# Patient Record
Sex: FEMALE | Employment: FULL TIME | URBAN - METROPOLITAN AREA
[De-identification: names, ages, dates, MRNs, and addresses within clinical notes are randomized per-mention and may not be internally consistent; named-entity substitution may affect disease eponyms.]

---

## 2017-05-08 ENCOUNTER — ALLSCRIPTS OFFICE VISIT (OUTPATIENT)
Dept: OTHER | Facility: OTHER | Age: 57
End: 2017-05-08

## 2017-05-08 DIAGNOSIS — E78.5 HYPERLIPIDEMIA: ICD-10-CM

## 2017-05-08 DIAGNOSIS — M25.50 PAIN IN JOINT: ICD-10-CM

## 2017-05-22 ENCOUNTER — APPOINTMENT (OUTPATIENT)
Dept: LAB | Facility: CLINIC | Age: 57
End: 2017-05-22
Payer: COMMERCIAL

## 2017-05-22 DIAGNOSIS — E78.5 HYPERLIPIDEMIA: ICD-10-CM

## 2017-05-22 DIAGNOSIS — M25.50 PAIN IN JOINT: ICD-10-CM

## 2017-05-22 LAB
25(OH)D3 SERPL-MCNC: 37.5 NG/ML (ref 30–100)
CHOLEST SERPL-MCNC: 223 MG/DL (ref 50–200)
HDLC SERPL-MCNC: 57 MG/DL (ref 40–60)
LDLC SERPL CALC-MCNC: 149 MG/DL (ref 0–100)
T4 FREE SERPL-MCNC: 0.94 NG/DL (ref 0.76–1.46)
TRIGL SERPL-MCNC: 83 MG/DL
TSH SERPL DL<=0.05 MIU/L-ACNC: 1.19 UIU/ML (ref 0.36–3.74)

## 2017-05-22 PROCEDURE — 86430 RHEUMATOID FACTOR TEST QUAL: CPT

## 2017-05-22 PROCEDURE — 82306 VITAMIN D 25 HYDROXY: CPT

## 2017-05-22 PROCEDURE — 36415 COLL VENOUS BLD VENIPUNCTURE: CPT

## 2017-05-22 PROCEDURE — 84443 ASSAY THYROID STIM HORMONE: CPT

## 2017-05-22 PROCEDURE — 84439 ASSAY OF FREE THYROXINE: CPT

## 2017-05-22 PROCEDURE — 80061 LIPID PANEL: CPT

## 2017-05-23 LAB — RHEUMATOID FACT SER QL LA: NEGATIVE

## 2017-05-29 ENCOUNTER — GENERIC CONVERSION - ENCOUNTER (OUTPATIENT)
Dept: OTHER | Facility: OTHER | Age: 57
End: 2017-05-29

## 2018-01-13 NOTE — RESULT NOTES
Discussion/Summary   Cholesterol is elevated at 223, which should ideally be less than 200  Bad cholesterol is elevated at 149, which should be less than 100  In addition to changes to diet and increasing exercise, I would recommend starting a low dose statin  Pravastatin has been prescribed  All other lab results are normal   Rheumatoid factor is negative  Verified Results  (1) RHEUMATOID FACTOR SCREEN [id number] 09:59AM Viridiana Lau Order Number: [id number]     Test Name Result Flag Reference   RHEUMATOID FACTOR Negative  Negative     (1) LIPID PANEL FASTING W DIRECT LDL REFLEX [id number] 09:59AM Gee DOMINGO Order Number: [id number]     Test Name Result Flag Reference   CHOLESTEROL 223 mg/dL H    LDL CHOLESTEROL CALCULATED 149 mg/dL H 0-100   Triglyceride:         Normal              <150 mg/dl       Borderline High    150-199 mg/dl       High               200-499 mg/dl       Very High          >499 mg/dl  Cholesterol:         Desirable        <200 mg/dl      Borderline High  200-239 mg/dl      High             >239 mg/dl  HDL Cholesterol:        High    >59 mg/dL      Low     <41 mg/dL  LDL Cholesterol:        Optimal          <100 mg/dl        Near Optimal     100-129 mg/dl        Above Optimal          Borderline High   130-159 mg/dl          High              160-189 mg/dl          Very High        >189 mg/dl  LDL CALCULATED:    This screening LDL is a calculated result  It does not have the accuracy of the Direct Measured LDL in the monitoring of patients with hyperlipidemia and/or statin therapy  Direct Measure LDL (YVW843) must be ordered separately in these patients  TRIGLYCERIDES 83 mg/dL  <=150   Specimen collection should occur prior to N-Acetylcysteine or Metamizole administration due to the potential for falsely depressed results     HDL,DIRECT 57 mg/dL  40-60   Specimen collection should occur prior to Metamizole administration due to the potential for falsely depressed results  (1) T4, FREE [id number] 09:59AM Oneta Boas Order Number: [id number]     Test Name Result Flag Reference   T4,FREE 0 94 ng/dL  0 76-1 46     (1) TSH [id number] 09:59AM [facility] Order Number: [id number]     Test Name Result Flag Reference   TSH 1 185 uIU/mL  0 358-3 740   Patients undergoing fluorescein dye angiography may retain small amounts of fluorescein in the body for 48-72 hours post procedure  Samples containing fluorescein can produce falsely depressed TSH values  If the patient had this procedure,a specimen should be resubmitted post fluorescein clearance  The recommended reference ranges for TSH during pregnancy are as follows:  First trimester 0 1 to 2 5 uIU/mL  Second trimester  0 2 to 3 0 uIU/mL  Third trimester 0 3 to 3 0 uIU/m     (1) VITAMIN D 25-HYDROXY [id number] 09:59AM [facility] Order Number: [id number]     Test Name Result Flag Reference   VIT D 25-HYDROX 37 5 ng/mL  30 0-100 0   This assay is a certified procedure of the CDC Vitamin D Standardization Certification Program (VDSCP)     Deficiency <20ng/ml   Insufficiency 20-30ng/ml   Sufficient  ng/ml     *Patients undergoing fluorescein dye angiography may retain small amounts of fluorescein in the body for 48-72 hours post procedure  Samples containing fluorescein can produce falsely elevated Vitamin D values  If the patient had this procedure, a specimen should be resubmitted post fluorescein clearance         Plan  Hyperlipidemia    · Pravastatin Sodium 20 MG Oral Tablet; TAKE 1 TABLET AT BEDTIME    Signatures   Electronically signed by : KURTIS Monroe; May 29 2017  5:48AM EST                       (Author)

## 2018-01-15 VITALS
SYSTOLIC BLOOD PRESSURE: 110 MMHG | HEART RATE: 80 BPM | TEMPERATURE: 96.3 F | RESPIRATION RATE: 16 BRPM | DIASTOLIC BLOOD PRESSURE: 60 MMHG | OXYGEN SATURATION: 98 % | HEIGHT: 61 IN | WEIGHT: 142.13 LBS | BODY MASS INDEX: 26.83 KG/M2

## 2020-06-02 ENCOUNTER — TELEMEDICINE (OUTPATIENT)
Dept: DERMATOLOGY | Facility: CLINIC | Age: 60
End: 2020-06-02
Payer: COMMERCIAL

## 2020-06-02 DIAGNOSIS — R21 RASH: ICD-10-CM

## 2020-06-02 DIAGNOSIS — L21.9 SEBORRHEIC DERMATITIS: Primary | ICD-10-CM

## 2020-06-02 PROCEDURE — 99204 OFFICE O/P NEW MOD 45 MIN: CPT | Performed by: STUDENT IN AN ORGANIZED HEALTH CARE EDUCATION/TRAINING PROGRAM

## 2020-06-02 RX ORDER — FLUOCINONIDE TOPICAL SOLUTION USP, 0.05% 0.5 MG/ML
SOLUTION TOPICAL
Qty: 60 ML | Refills: 3 | Status: SHIPPED | OUTPATIENT
Start: 2020-06-02 | End: 2020-07-28 | Stop reason: SDUPTHER

## 2020-06-02 RX ORDER — KETOCONAZOLE 20 MG/G
CREAM TOPICAL
Qty: 60 G | Refills: 3 | Status: SHIPPED | OUTPATIENT
Start: 2020-06-02 | End: 2020-07-28 | Stop reason: SDUPTHER

## 2020-06-02 RX ORDER — KETOCONAZOLE 20 MG/ML
SHAMPOO TOPICAL
Qty: 120 ML | Refills: 3 | Status: SHIPPED | OUTPATIENT
Start: 2020-06-02 | End: 2020-07-28 | Stop reason: SDUPTHER

## 2020-06-02 RX ORDER — KETOTIFEN FUMARATE 0.35 MG/ML
1 SOLUTION/ DROPS OPHTHALMIC EVERY 12 HOURS PRN
COMMUNITY
Start: 2015-05-22

## 2020-06-25 ENCOUNTER — TELEPHONE (OUTPATIENT)
Dept: DERMATOLOGY | Facility: CLINIC | Age: 60
End: 2020-06-25

## 2020-07-28 ENCOUNTER — OFFICE VISIT (OUTPATIENT)
Dept: DERMATOLOGY | Facility: CLINIC | Age: 60
End: 2020-07-28
Payer: COMMERCIAL

## 2020-07-28 VITALS — BODY MASS INDEX: 26.51 KG/M2 | WEIGHT: 140.4 LBS | HEIGHT: 61 IN | TEMPERATURE: 97.4 F

## 2020-07-28 DIAGNOSIS — L21.9 SEBORRHEIC DERMATITIS: ICD-10-CM

## 2020-07-28 PROCEDURE — 99213 OFFICE O/P EST LOW 20 MIN: CPT | Performed by: STUDENT IN AN ORGANIZED HEALTH CARE EDUCATION/TRAINING PROGRAM

## 2020-07-28 RX ORDER — KETOCONAZOLE 20 MG/G
CREAM TOPICAL
Qty: 60 G | Refills: 3 | Status: SHIPPED | OUTPATIENT
Start: 2020-07-28 | End: 2022-04-18

## 2020-07-28 RX ORDER — FLUOCINONIDE TOPICAL SOLUTION USP, 0.05% 0.5 MG/ML
SOLUTION TOPICAL
Qty: 60 ML | Refills: 3 | Status: SHIPPED | OUTPATIENT
Start: 2020-07-28 | End: 2022-04-18

## 2020-07-28 RX ORDER — KETOCONAZOLE 20 MG/ML
SHAMPOO TOPICAL
Qty: 120 ML | Refills: 3 | Status: SHIPPED | OUTPATIENT
Start: 2020-07-28 | End: 2022-04-18

## 2020-07-28 NOTE — PROGRESS NOTES
[facility] Follow Up Note    Patient Name: Nam Mckinley  Encounter Date: 7/28/2020    Today's Chief Concerns:   Concern #1:  Follow up for thumb   Concern #2:     Concern #3:      Current Medications:    Current Outpatient Medications:     fluocinonide (LIDEX) 0 05 % external solution, Apply topically to scalp one to two times a day when itchy , Disp: 60 mL, Rfl: 3    ketoconazole (NIZORAL) 2 % cream, Apply topically one to two times a day to affected areas of face when itchy, Disp: 60 g, Rfl: 3    ketoconazole (NIZORAL) 2 % shampoo, Apply Shampoo, lather and leave for 5 minutes then rinse  Use two to three times a week , Disp: 120 mL, Rfl: 3    ketotifen (ZADITOR) 0 025 % ophthalmic solution, Apply 1 drop to eye every 12 (twelve) hours as needed, Disp: , Rfl:     lifitegrast (Xiidra) 5 % op solution, , Disp: , Rfl:     mupirocin (BACTROBAN) 2 % ointment, Apply to left thumb daily after washing with soap and water  Apply band aid after putting ointment on , Disp: 30 g, Rfl: 3    CONSTITUTIONAL:   Vitals:    07/28/20 0803   Temp: (!) 97 4 °F (36 3 °C)   Weight: 63 7 kg (140 lb 6 4 oz)   Height: 5' 1" (1 549 m)         Specific Alerts:    Have you been seen by a [facility] Dermatologist in the last 3 years? YES    Are you pregnant or planning to become pregnant? No    Are you currently or planning to be nursing or breast feeding? No    No Known Allergies    May we call your Preferred Phone number to discuss your specific medical information? YES    May we leave a detailed message that includes your specific medical information? YES    Have you traveled outside of the [facility] in the past 3 months? No    Do you currently have a pacemaker or defibrillator? No    Do you have any artificial heart valves, joints, plates, screws, rods, stents, pins, etc? No   - If Yes, were any placed within the last 2 years? Do you require any medications prior to a surgical procedure? No   - If Yes, for which procedure? - If Yes, what medications to you require? Are you taking any medications that cause you to bleed more easily ("blood thinners") No    Have you ever experienced a rapid heartbeat with epinephrine? No    Have you ever been treated with "gold" (gold sodium thiomalate) therapy? No    [facility] can help with wrinkles, "laugh lines," facial volume loss, "double chin," "love handles," age spots, and more  Are you interested in learning today about some of the skin enhancement procedures that we offer? (If Yes, please provide more detail) No    Review of Systems:  Have you recently had or currently have any of the following? · Fever or chills: No  · Night Sweats: No  · Headaches: No  · Weight Gain: No  · Weight Loss: No  · Blurry Vision: No  · Nausea: No  · Vomiting: No  · Diarrhea: No  · Blood in Stool: No  · Abdominal Pain: No  · Itchy Skin: No  · Painful Joints: No  · Swollen Joints: No  · Muscle Pain: No  · Irregular Mole: No  · Sun Burn: No  · Dry Skin: No  · Skin Color Changes: No  · Scar or Keloid: No  · Cold Sores/Fever Blisters: No  · Bacterial Infections/MRSA: No  · Anxiety: No  · Depression: No  · Suicidal or Homicidal Thoughts: No      PSYCH: Normal mood and affect  EYES: Normal conjunctiva  ENT: Normal lips and oral mucosa  CARDIOVASCULAR: No edema  RESPIRATORY: Normal respirations  HEME/LYMPH/IMMUNO:  No regional lymphadenopathy except as noted below in ASSESSMENT AND PLAN BY DIAGNOSIS    FULL ORGAN SYSTEM SKIN EXAM (SKIN)   Hair, Scalp, Ears, Face Normal except as noted below in Assessment   Neck Normal except as noted below in Assessment       Left Arm/Hand/Fingers Normal except as noted below in Assessment       1  SEBORRHEIC DERMATITIS    Physical Exam:   Anatomic Location Affected:  Scalp, upper back   Morphological Description:  Diffuse scale throughout scalp, no rash noted currently   Very tender/itchy to touch   Pertinent Positives:   Pertinent Negatives: No lymphadenopathy    Additional History of Present Condition:  Patient states she still have severe itching on her scalp and upper back  She states the Ketoconazole shampoo and cream and Lidex solution are helping with the symptoms  She is concerned she will have to continue to use the products for the rest of her life  Assessment and Plan:  Based on a thorough discussion of this condition and the management approach to it (including a comprehensive discussion of the known risks, side effects and potential benefits of treatment), the patient (family) agrees to implement the following specific plan:   Only use Ketoconazole 2% shampoo 2-3 times a week  Lather into hair and scalp, let sit for 5 minutes and rinse   Apply Lidex 0 05% solution one to two times daily for itch on scalp   Apply Ketoconazole 2% cream one to two times a day on face or upper back for itch   Try to limit and manage stress if possible   Rinse shampoo and conditioner out of hair completely   Try removing one hair care product a week to see if it helps the condition  Seborrheic Dermatitis   Seborrheic dermatitis is a common, chronic or relapsing form of eczema/dermatitis that mainly affects the sebaceous, gland-rich regions of the scalp, face, and trunk  There are infantile and adult forms of seborrhoeic dermatitis  It is sometimes associated with psoriasis and, in that clinical scenario, may be referred to as "sebo-psoriasis "  Seborrheic dermatitis is also known as "seborrheic eczema "  Dandruff (also called "pityriasis capitis") is an uninflamed form of seborrhoeic dermatitis  Dandruff presents as bran-like scaly patches scattered within hair-bearing areas of the scalp  In an infant, this condition may be referred to as "cradle cap "  The cause of seborrheic dermatitis is not completely understood   It is associated with proliferation of various species of the skin commensal Malassezia, in its yeast (non-pathogenic) form  Its metabolites (such as the fatty acids oleic acid, malssezin, and indole-3-carbaldehyde) may cause an inflammatory reaction  Differences in skin barrier lipid content and function may account for individual presentations  Adult Seborrheic Dermatitis  Adult seborrheic dermatitis tends to begin in late adolescence; prevalence is greatest in young adults and in the elderly  It is more common in males than in females  The following factors are sometimes associated with severe adult seborrheic dermatitis:   Oily skin   Familial tendency to seborrhoeic dermatitis or a family history of psoriasis   Immunosuppression: organ transplant recipient, human immunodeficiency virus (HIV) infection and patients with lymphoma   Neurological and psychiatric diseases: Parkinson disease, tardive dyskinesia, depression, epilepsy, facial nerve palsy, spinal cord injury and congenital disorders such as Down syndrome   Treatment for psoriasis with psoralen and ultraviolet A (PUVA) therapy   Lack of sleep   Stressful events  In adults, seborrheic dermatitis may typically affect the scalp, face (creases around the nose, behind ears, within eyebrows) and upper trunk  Typical clinical features include:   Winter flares, improving in summer following sun exposure   Minimal itch most of the time   Combination oily and dry mid-facial skin   Ill-defined localized scaly patches or diffuse scale in the scalp   Blepharitis; scaly red eyelid margins   [place]-pink, thin, scaly, and ill-defined plaques in skin folds on both sides of the face   Petal or ring-shaped flaky patches on hair-line and on anterior chest   Rash in armpits, under the breasts, in the groin folds and genital creases   Superficial folliculitis (inflamed hair follicles) on cheeks and upper trunk    Seborrheic dermatitis is diagnosed by its clinical appearance and behavior   Skin biopsy may be helpful but is rarely necessary to make this diagnosis  2     RASH: healing lesion s/p cryotherapy for verruca at plastic surgery office at [facility]     Physical Exam:  · (Anatomic Location); (Size and Morphological Description); (Differential Diagnosis):  ? Left thumb--> HEAED  · Pertinent Positives:  · Pertinent Negatives:     ? Additional History of Present Condition:   Frozen twice by placetic surgery at [facility]    Was diagnosed by [facility] as a wart         Assessment and Plan:  Based on a thorough discussion of this condition and the management approach to it (including a comprehensive discussion of the known risks, side effects and potential benefits of treatment), the patient (family) agrees to implement the following specific plan:  Area now healed        Scribe Attestation    I,:   Skye Blackwood am acting as a scribe while in the presence of the attending physician :        I,:   Char Rey MD personally performed the services described in this documentation    as scribed in my presence :

## 2020-07-28 NOTE — PATIENT INSTRUCTIONS
1  SEBORRHEIC DERMATITIS    Physical Exam:   Anatomic Location Affected:  Scalp, upper back   Morphological Description:     Pertinent Positives:   Pertinent Negatives: No lymphadenopathy    Additional History of Present Condition:  Patient states she still have severe itching on her scalp and upper back  She states the Ketoconazole shampoo and cream and Lidex solution are helping with the symptoms  She is concerned she will have to continue to use the products for the rest of her life  Assessment and Plan:  Based on a thorough discussion of this condition and the management approach to it (including a comprehensive discussion of the known risks, side effects and potential benefits of treatment), the patient (family) agrees to implement the following specific plan:   Only use Ketoconazole 2% shampoo 2-3 times a week  Lather into hair and scalp, let sit for 5 minutes and rinse   Apply Lidex 0 05% solution one to two times daily for itch on scalp   Apply Ketoconazole 2% cream one to two times a day on upper back for itch   Try to limit and manage stress if possible   Rinse shampoo and conditioner out of hair completely   Try removing one hair care product a week to see if it helps the condition  Seborrheic Dermatitis   Seborrheic dermatitis is a common, chronic or relapsing form of eczema/dermatitis that mainly affects the sebaceous, gland-rich regions of the scalp, face, and trunk  There are infantile and adult forms of seborrhoeic dermatitis  It is sometimes associated with psoriasis and, in that clinical scenario, may be referred to as "sebo-psoriasis "  Seborrheic dermatitis is also known as "seborrheic eczema "  Dandruff (also called "pityriasis capitis") is an uninflamed form of seborrhoeic dermatitis  Dandruff presents as bran-like scaly patches scattered within hair-bearing areas of the scalp    In an infant, this condition may be referred to as "cradle cap "  The cause of seborrheic dermatitis is not completely understood  It is associated with proliferation of various species of the skin commensal Malassezia, in its yeast (non-pathogenic) form  Its metabolites (such as the fatty acids oleic acid, malssezin, and indole-3-carbaldehyde) may cause an inflammatory reaction  Differences in skin barrier lipid content and function may account for individual presentations  Adult Seborrheic Dermatitis  Adult seborrheic dermatitis tends to begin in late adolescence; prevalence is greatest in young adults and in the elderly  It is more common in males than in females  The following factors are sometimes associated with severe adult seborrheic dermatitis:   Oily skin   Familial tendency to seborrhoeic dermatitis or a family history of psoriasis   Immunosuppression: organ transplant recipient, human immunodeficiency virus (HIV) infection and patients with lymphoma   Neurological and psychiatric diseases: Parkinson disease, tardive dyskinesia, depression, epilepsy, facial nerve palsy, spinal cord injury and congenital disorders such as Down syndrome   Treatment for psoriasis with psoralen and ultraviolet A (PUVA) therapy   Lack of sleep   Stressful events  In adults, seborrheic dermatitis may typically affect the scalp, face (creases around the nose, behind ears, within eyebrows) and upper trunk   Typical clinical features include:   Winter flares, improving in summer following sun exposure   Minimal itch most of the time   Combination oily and dry mid-facial skin   Ill-defined localized scaly patches or diffuse scale in the scalp   Blepharitis; scaly red eyelid margins   [place]-pink, thin, scaly, and ill-defined plaques in skin folds on both sides of the face   Petal or ring-shaped flaky patches on hair-line and on anterior chest   Rash in armpits, under the breasts, in the groin folds and genital creases   Superficial folliculitis (inflamed hair follicles) on cheeks and upper trunk    Seborrheic dermatitis is diagnosed by its clinical appearance and behavior  Skin biopsy may be helpful but is rarely necessary to make this diagnosis

## 2020-09-15 ENCOUNTER — TELEPHONE (OUTPATIENT)
Dept: DERMATOLOGY | Facility: CLINIC | Age: 60
End: 2020-09-15

## 2020-09-15 NOTE — TELEPHONE ENCOUNTER
Patient called in stating that someone from the pharmacy told her that her RX's has been canceled  She is requesting a refill on her shampoo and her lotion  She asked to speak with Karen Angeles and was told she is busy at the moment but I will send her a message

## 2021-03-18 ENCOUNTER — IMMUNIZATIONS (OUTPATIENT)
Dept: FAMILY MEDICINE CLINIC | Facility: HOSPITAL | Age: 61
End: 2021-03-18

## 2021-03-18 DIAGNOSIS — Z23 ENCOUNTER FOR IMMUNIZATION: Primary | ICD-10-CM

## 2021-03-18 PROCEDURE — 91300 SARS-COV-2 / COVID-19 MRNA VACCINE (PFIZER-BIONTECH) 30 MCG: CPT | Performed by: ANESTHESIOLOGY

## 2021-03-18 PROCEDURE — 0001A SARS-COV-2 / COVID-19 MRNA VACCINE (PFIZER-BIONTECH) 30 MCG: CPT | Performed by: ANESTHESIOLOGY

## 2021-04-10 ENCOUNTER — IMMUNIZATIONS (OUTPATIENT)
Dept: FAMILY MEDICINE CLINIC | Facility: HOSPITAL | Age: 61
End: 2021-04-10

## 2021-04-10 DIAGNOSIS — Z23 ENCOUNTER FOR IMMUNIZATION: Primary | ICD-10-CM

## 2021-04-10 PROCEDURE — 0002A SARS-COV-2 / COVID-19 MRNA VACCINE (PFIZER-BIONTECH) 30 MCG: CPT

## 2021-04-10 PROCEDURE — 91300 SARS-COV-2 / COVID-19 MRNA VACCINE (PFIZER-BIONTECH) 30 MCG: CPT

## 2021-05-18 ENCOUNTER — EVALUATION (OUTPATIENT)
Dept: PHYSICAL THERAPY | Facility: CLINIC | Age: 61
End: 2021-05-18
Payer: COMMERCIAL

## 2021-05-18 DIAGNOSIS — M54.2 NECK PAIN: Primary | ICD-10-CM

## 2021-05-18 PROCEDURE — 97161 PT EVAL LOW COMPLEX 20 MIN: CPT | Performed by: PHYSICAL THERAPIST

## 2021-05-18 PROCEDURE — 97110 THERAPEUTIC EXERCISES: CPT | Performed by: PHYSICAL THERAPIST

## 2021-05-18 NOTE — LETTER
May 21, 2021    [address] [id number]    Patient: Sofia Litten   YOB: 1960   Date of Visit: 2021     Encounter Diagnosis     ICD-10-CM    1  Neck pain  M54 2        Dear Dr Ursula Ceballos Recipients: Thank you for your recent referral of Sofia Litten  Please review the attached evaluation summary from [facility] recent visit  Please verify that you agree with the plan of care by signing the attached order  If you have any questions or concerns, please do not hesitate to call  I sincerely appreciate the opportunity to share in the care of one of your patients and hope to have another opportunity to work with you in the near future  Sincerely,    Marcello Webber, PT      Referring Provider:      I certify that I have read the below Plan of Care and certify the need for these services furnished under this plan of treatment while under my care  MD Elizabeth [address] [id number]  Via Fax: [phone]          PT Evaluation     Today's date: 2021  Patient name: Sofia Litten  : 1960  MRN: [id number]  Referring provider: No ref  provider found  Dx:   Encounter Diagnosis     ICD-10-CM    1  Neck pain  M54 2                   Assessment  Assessment details: Patient presents with signs and symptoms consistent with referring diagnosis  A cervical mechanical assessment was performed with patient's symptoms consistent with a posterior derangement iwht a treatment principle of cervical retraction/extension  Her prolonged sitting is a contributing factor to symptoms  Positive prognostic indicators include: Directional preference found  Negative prognostic indicators include: overall recent worsening presentation  She presents with: pain, decreased: ROM, strength and  functional capacity  She requires skilled PT to address these deficits and restore maximal functional capacity    Thank you for this pleasant referral        Impairments: abnormal or restricted ROM, activity intolerance, impaired physical strength, lacks appropriate home exercise program and pain with function  Understanding of Dx/Px/POC: good   Prognosis: good    Goals  ST-6 weeks  1  Patient to be independent with HEP  2   Decrease pain at least 2 subjective levels  LT-12 weeks  1    Patient to voice comfort with self management of condition  2   75% or > decreased pain  3   75% or > decreased functional deficits  4   Normalize AROM of all deficit planes  7   Patient to voice understanding of activities/positions to avoid  8   Centralize symptoms  Plan  Patient would benefit from: skilled PT  Referral necessary: No  Planned modality interventions: cryotherapy  Planned therapy interventions: IADL retraining, joint mobilization, manual therapy, motor coordination training, neuromuscular re-education, patient education, postural training, self care, strengthening, stretching, therapeutic activities, therapeutic exercise, home exercise program, flexibility, ADL training, balance and body mechanics training  Frequency: 2x week  Duration in weeks: 6  Treatment plan discussed with: patient        Subjective Evaluation    History of Present Illness  Onset date: 6 months ago; worse 2 months ago; 2-3 weeks ago  Mechanism of injury: Chief Complaint: R sided neck pain    History:  Pt notes onset of R sided neck pain beginning about 6 months ago which worsened 2 months ago and became extremely painful 2-3 weeks ago  Prior treatment consisted of chiropractic care without relief  Her treatments consisted of adjustments and massage  She has not had any imaging- she was given orders but does not have the money  She works as an  (sitting/computer work)         PMH: (-)     Aggravating factors: Lying supine, cervical flexion/extension, R rotation  Relieving factors: TENS, ice, meds, not wearing a bra    Functional Deficits:  Driving, playing tennis    Patient Goals:to get well    Quality of life: good    Pain  At best pain ratin  At worst pain rating: 10  Location: R Cervical spine, [place]          Objective     Postural Observations  Seated posture: poor  Standing posture: poor  Correction of posture: makes symptoms better        Active Range of Motion   Cervical/Thoracic Spine       Cervical  Subcranial protraction:  WFL and with pain   Subcranial retraction:   Restriction level: minimal  Extension:  with pain Restriction level: moderate  Left rotation:  with pain Restriction level: minimal  Right rotation:  with pain Restriction level: moderate    Joint Play     Hypomobile: C3, C4, C5, C6, C7 and T1   Mechanical Assessment    Cervical    Seated Protrusion: repeated movements   Pain intensity: worse  Pain level: increased  Seated retraction: repeated movements   Pain location: no change  Pain level: decreased  Seated Extension: repeated movements  Pain intensity: better  Pain level: decreased    Thoracic      Lumbar      General Comments:      Cervical/Thoracic Comments  (-) Neuromotor Screen  (-) ULTT  (-) Remainder UQS             Precautions: (-)      Manuals /18            Cx Retraction/Extension Mobilization                                                    Neuro Re-Ed             Posture Ed             Scap Squeezes                                                                              Ther Ex             HEP 10'            Cx Retraction             Cx Retraction/Ext             Cx Retraction pt OP             Prone Cx Retraction             Prone Cx Retraction/Extension                                       Ther Activity                                       Gait Training                                       Modalities

## 2021-05-18 NOTE — PROGRESS NOTES
PT Evaluation     Today's date: 2021  Patient name: Mala Ruelas  : 1960  MRN: [id number]  Referring provider: No ref  provider found  Dx:   Encounter Diagnosis     ICD-10-CM    1  Neck pain  M54 2                   Assessment  Assessment details: Patient presents with signs and symptoms consistent with referring diagnosis  A cervical mechanical assessment was performed with patient's symptoms consistent with a posterior derangement iwht a treatment principle of cervical retraction/extension  Her prolonged sitting is a contributing factor to symptoms  Positive prognostic indicators include: Directional preference found  Negative prognostic indicators include: overall recent worsening presentation  She presents with: pain, decreased: ROM, strength and  functional capacity  She requires skilled PT to address these deficits and restore maximal functional capacity  Thank you for this pleasant referral        Impairments: abnormal or restricted ROM, activity intolerance, impaired physical strength, lacks appropriate home exercise program and pain with function  Understanding of Dx/Px/POC: good   Prognosis: good    Goals  ST-6 weeks  1  Patient to be independent with HEP  2   Decrease pain at least 2 subjective levels  LT-12 weeks  1    Patient to voice comfort with self management of condition  2   75% or > decreased pain  3   75% or > decreased functional deficits  4   Normalize AROM of all deficit planes  7   Patient to voice understanding of activities/positions to avoid  8   Centralize symptoms        Plan  Patient would benefit from: skilled PT  Referral necessary: No  Planned modality interventions: cryotherapy  Planned therapy interventions: IADL retraining, joint mobilization, manual therapy, motor coordination training, neuromuscular re-education, patient education, postural training, self care, strengthening, stretching, therapeutic activities, therapeutic exercise, home exercise program, flexibility, ADL training, balance and body mechanics training  Frequency: 2x week  Duration in weeks: 6  Treatment plan discussed with: patient        Subjective Evaluation    History of Present Illness  Onset date: 6 months ago; worse 2 months ago; 2-3 weeks ago  Mechanism of injury: Chief Complaint: R sided neck pain    History:  Pt notes onset of R sided neck pain beginning about 6 months ago which worsened 2 months ago and became extremely painful 2-3 weeks ago  Prior treatment consisted of chiropractic care without relief  Her treatments consisted of adjustments and massage  She has not had any imaging- she was given orders but does not have the money  She works as an  (sitting/computer work)         PMH: (-)     Aggravating factors: Lying supine, cervical flexion/extension, R rotation  Relieving factors: TENS, ice, meds, not wearing a bra    Functional Deficits:  Driving, playing tennis    Patient Goals:to get well    Quality of life: good    Pain  At best pain ratin  At worst pain rating: 10  Location: R Cervical spine, [place]          Objective     Postural Observations  Seated posture: poor  Standing posture: poor  Correction of posture: makes symptoms better        Active Range of Motion   Cervical/Thoracic Spine       Cervical  Subcranial protraction:  WFL and with pain   Subcranial retraction:   Restriction level: minimal  Extension:  with pain Restriction level: moderate  Left rotation:  with pain Restriction level: minimal  Right rotation:  with pain Restriction level: moderate    Joint Play     Hypomobile: C3, C4, C5, C6, C7 and T1   Mechanical Assessment    Cervical    Seated Protrusion: repeated movements   Pain intensity: worse  Pain level: increased  Seated retraction: repeated movements   Pain location: no change  Pain level: decreased  Seated Extension: repeated movements  Pain intensity: better  Pain level: decreased    Thoracic      Lumbar      General Comments:      Cervical/Thoracic Comments  (-) Neuromotor Screen  (-) ULTT  (-) Remainder UQS             Precautions: (-)      Manuals 5/18            Cx Retraction/Extension Mobilization                                                    Neuro Re-Ed             Posture Ed             Scap Squeezes                                                                              Ther Ex             HEP 10'            Cx Retraction             Cx Retraction/Ext             Cx Retraction pt OP             Prone Cx Retraction             Prone Cx Retraction/Extension                                       Ther Activity                                       Gait Training                                       Modalities

## 2021-05-19 ENCOUNTER — TRANSCRIBE ORDERS (OUTPATIENT)
Dept: PHYSICAL THERAPY | Facility: CLINIC | Age: 61
End: 2021-05-19

## 2021-05-19 DIAGNOSIS — M54.2 CERVICALGIA: Primary | ICD-10-CM

## 2021-06-01 ENCOUNTER — OFFICE VISIT (OUTPATIENT)
Dept: PHYSICAL THERAPY | Facility: CLINIC | Age: 61
End: 2021-06-01
Payer: COMMERCIAL

## 2021-06-01 DIAGNOSIS — M54.2 NECK PAIN: Primary | ICD-10-CM

## 2021-06-01 PROCEDURE — 97140 MANUAL THERAPY 1/> REGIONS: CPT | Performed by: PHYSICAL THERAPIST

## 2021-06-01 PROCEDURE — 97110 THERAPEUTIC EXERCISES: CPT | Performed by: PHYSICAL THERAPIST

## 2021-06-01 NOTE — PROGRESS NOTES
Daily Note     Today's date: 2021  Patient name: Vicki Baez  : 1960  MRN: [id number]  Referring provider: No ref  provider found  Dx:   Encounter Diagnosis     ICD-10-CM    1  Neck pain  M54 2                   Subjective: Pt "a little better" since IE, but pain with repeated seated retraction      Objective: See treatment diary below  Pain reduced with supine retraction/retraction OP      Assessment: Tolerated treatment well  Patient more tolerant of NWB retraction today  Did not tolerate retraciton/extension well, better with retraction      Plan: Continue per plan of care        Precautions: (-)      Manuals            Cx Retraction/Extension Mobilization  10                                                  Neuro Re-Ed             Posture Ed             Scap Squeezes                                                                              Ther Ex             HEP 10'            Cx Retraction  20           Cx Retraction/Ext             Cx Retraction pt OP  20           Prone Cx Retraction             Prone Cx Retraction/Extension             Supine retraction  20           Supine retraction pt OP  20           Ther Activity                                       Gait Training                                       Modalities

## 2021-06-15 ENCOUNTER — APPOINTMENT (OUTPATIENT)
Dept: PHYSICAL THERAPY | Facility: CLINIC | Age: 61
End: 2021-06-15
Payer: COMMERCIAL

## 2021-11-23 ENCOUNTER — EVALUATION (OUTPATIENT)
Dept: PHYSICAL THERAPY | Facility: CLINIC | Age: 61
End: 2021-11-23
Payer: COMMERCIAL

## 2021-11-23 DIAGNOSIS — G89.29 CHRONIC PAIN OF LEFT KNEE: Primary | ICD-10-CM

## 2021-11-23 DIAGNOSIS — G89.29 CHRONIC PAIN OF RIGHT KNEE: ICD-10-CM

## 2021-11-23 DIAGNOSIS — M25.562 CHRONIC PAIN OF LEFT KNEE: Primary | ICD-10-CM

## 2021-11-23 DIAGNOSIS — M25.561 CHRONIC PAIN OF RIGHT KNEE: ICD-10-CM

## 2021-11-23 PROCEDURE — 97162 PT EVAL MOD COMPLEX 30 MIN: CPT | Performed by: PHYSICAL THERAPIST

## 2021-11-23 PROCEDURE — 97110 THERAPEUTIC EXERCISES: CPT | Performed by: PHYSICAL THERAPIST

## 2021-11-29 ENCOUNTER — OFFICE VISIT (OUTPATIENT)
Dept: PHYSICAL THERAPY | Facility: CLINIC | Age: 61
End: 2021-11-29
Payer: COMMERCIAL

## 2021-11-29 DIAGNOSIS — G89.29 CHRONIC PAIN OF LEFT KNEE: Primary | ICD-10-CM

## 2021-11-29 DIAGNOSIS — M25.562 CHRONIC PAIN OF LEFT KNEE: Primary | ICD-10-CM

## 2021-11-29 DIAGNOSIS — M25.561 CHRONIC PAIN OF RIGHT KNEE: ICD-10-CM

## 2021-11-29 DIAGNOSIS — G89.29 CHRONIC PAIN OF RIGHT KNEE: ICD-10-CM

## 2021-11-29 PROCEDURE — 97110 THERAPEUTIC EXERCISES: CPT

## 2021-11-29 PROCEDURE — 97530 THERAPEUTIC ACTIVITIES: CPT

## 2022-03-01 ENCOUNTER — OFFICE VISIT (OUTPATIENT)
Dept: URGENT CARE | Facility: CLINIC | Age: 62
End: 2022-03-01
Payer: COMMERCIAL

## 2022-03-01 VITALS
RESPIRATION RATE: 16 BRPM | TEMPERATURE: 98.7 F | WEIGHT: 140 LBS | HEART RATE: 95 BPM | HEIGHT: 61 IN | OXYGEN SATURATION: 97 % | BODY MASS INDEX: 26.43 KG/M2

## 2022-03-01 DIAGNOSIS — J01.00 ACUTE MAXILLARY SINUSITIS, RECURRENCE NOT SPECIFIED: Primary | ICD-10-CM

## 2022-03-01 PROCEDURE — S9083 URGENT CARE CENTER GLOBAL: HCPCS | Performed by: PHYSICIAN ASSISTANT

## 2022-03-01 PROCEDURE — G0382 LEV 3 HOSP TYPE B ED VISIT: HCPCS | Performed by: PHYSICIAN ASSISTANT

## 2022-03-01 RX ORDER — AZITHROMYCIN 250 MG/1
TABLET, FILM COATED ORAL
Qty: 6 TABLET | Refills: 0 | Status: SHIPPED | OUTPATIENT
Start: 2022-03-01 | End: 2022-03-05

## 2022-03-01 NOTE — LETTER
March 1, 2022     Patient: Nam Mckinley   YOB: 1960   Date of Visit: 3/1/2022       To Whom It May Concern: It is my medical opinion that Nam Mckinley may return to work on 3/7/2022  If you have any questions or concerns, please don't hesitate to call           Sincerely,        Zahida Fischer PA-C    CC: No Recipients

## 2022-03-01 NOTE — PROGRESS NOTES
[facility]        NAME: Tracie Loaiza is a 62 y o  female  : 1960    MRN: [id number]  DATE: 2022  TIME: 6:38 PM    Assessment and Plan   Acute maxillary sinusitis, recurrence not specified [J01 00]  1  Acute maxillary sinusitis, recurrence not specified  azithromycin (ZITHROMAX) 250 mg tablet         Patient Instructions     Patient Instructions   Continue OTC cough/cold medication for next 3-4 days as symptoms are most likely viral  If symptoms are unresolved to fill prescribed antibiotic  Follow up with PCP  Return or be seen in ER with any progressing or worsening symptoms  Follow up with PCP in 3-5 days  Proceed to  ER if symptoms worsen  Chief Complaint     Chief Complaint   Patient presents with    Sore Throat     sore throat, headache, sinus congestion, body aches x 5 days  No fever  Negative rapid test taken yesterday  No exposure  Lost voice yesterday  Taking mucinex  History of Present Illness       Patient is a 32-year-old female presenting today with cold symptoms x5 days  Patient notes over the last few days she has had nasal congestion, postnasal drip and a cough, has been taking over-the-counter Mucinex for the last couple days which she states has provided no resolution and that over the last couple days has developed a sore throat, notes that her  and son are at home also experiencing similar symptoms but her feeling better today  Notes that she took an at home COVID test yesterday which was negative  Notes that she is fully vaccinated for COVID-19  Denies fever, chills, SOB, N/V/D  Review of Systems   Review of Systems   Constitutional: Negative for chills, fatigue and fever  HENT: Positive for congestion, postnasal drip, sinus pressure and sore throat  Negative for ear pain and trouble swallowing  Eyes: Negative for pain  Respiratory: Positive for cough  Negative for shortness of breath      Gastrointestinal: Negative for abdominal pain  Musculoskeletal: Negative for arthralgias and myalgias  Skin: Negative for rash  Neurological: Negative for headaches  Current Medications       Current Outpatient Medications:     fluocinonide (LIDEX) 0 05 % external solution, Apply topically to scalp one to two times a day when itchy , Disp: 60 mL, Rfl: 3    ketoconazole (NIZORAL) 2 % cream, Apply topically one to two times a day to affected areas of face or back when itchy, Disp: 60 g, Rfl: 3    ketoconazole (NIZORAL) 2 % shampoo, Apply Shampoo, lather and leave for 5 minutes then rinse  Use two to three times a week , Disp: 120 mL, Rfl: 3    ketotifen (ZADITOR) 0 025 % ophthalmic solution, Apply 1 drop to eye every 12 (twelve) hours as needed, Disp: , Rfl:     lifitegrast (Xiidra) 5 % op solution, , Disp: , Rfl:     azithromycin (ZITHROMAX) 250 mg tablet, Take 2 tablets today then 1 tablet daily x 4 days, Disp: 6 tablet, Rfl: 0    mupirocin (BACTROBAN) 2 % ointment, Apply to left thumb daily after washing with soap and water  Apply band aid after putting ointment on  (Patient not taking: Reported on 3/1/2022 ), Disp: 30 g, Rfl: 3    Current Allergies     Allergies as of 03/01/2022    (No Known Allergies)            The following portions of the patient's history were reviewed and updated as appropriate: allergies, current medications, past family history, past medical history, past social history, past surgical history and problem list      History reviewed  No pertinent past medical history  Past Surgical History:   Procedure Laterality Date    CHOLECYSTECTOMY         History reviewed  No pertinent family history  Medications have been verified  Objective   Pulse 95   Temp 98 7 °F (37 1 °C)   Resp 16   Ht 5' 1" (1 549 m)   Wt 63 5 kg (140 lb)   SpO2 97%   BMI 26 45 kg/m²        Physical Exam     Physical Exam  Vitals reviewed  Constitutional:       General: She is not in acute distress  Appearance: Normal appearance  She is not toxic-appearing  HENT:      Head: Normocephalic and atraumatic  Right Ear: Tympanic membrane, ear canal and external ear normal       Left Ear: Tympanic membrane, ear canal and external ear normal       Nose: Congestion and rhinorrhea present  Right Sinus: Maxillary sinus tenderness present  No frontal sinus tenderness  Left Sinus: Maxillary sinus tenderness present  No frontal sinus tenderness  Mouth/Throat:      Mouth: Mucous membranes are moist       Comments: Mild erythema of pharynx, findings consistent with postnasal drip, no tonsillar swelling erythema or exudate, uvula midline  Eyes:      Conjunctiva/sclera: Conjunctivae normal    Cardiovascular:      Rate and Rhythm: Normal rate and regular rhythm  Pulses: Normal pulses  Heart sounds: Normal heart sounds  Pulmonary:      Effort: Pulmonary effort is normal       Breath sounds: Normal breath sounds  Lymphadenopathy:      Cervical: No cervical adenopathy  Skin:     General: Skin is warm  Capillary Refill: Capillary refill takes less than 2 seconds  Neurological:      General: No focal deficit present  Mental Status: She is alert and oriented to person, place, and time

## 2022-03-01 NOTE — PATIENT INSTRUCTIONS
Continue OTC cough/cold medication for next 3-4 days as symptoms are most likely viral  If symptoms are unresolved to fill prescribed antibiotic  Follow up with PCP  Return or be seen in ER with any progressing or worsening symptoms

## 2022-03-16 ENCOUNTER — RA CDI HCC (OUTPATIENT)
Dept: OTHER | Facility: HOSPITAL | Age: 62
End: 2022-03-16

## 2022-03-16 NOTE — PROGRESS NOTES
[facility]  coding opportunities       Chart reviewed, no opportunity found: CHART REVIEWED, NO OPPORTUNITY FOUND        Patients Insurance        Commercial Insurance: [address] [place] [address]

## 2022-03-23 PROBLEM — R73.03 PREDIABETES: Status: ACTIVE | Noted: 2022-03-23

## 2022-03-23 PROBLEM — Z00.00 ANNUAL PHYSICAL EXAM: Status: ACTIVE | Noted: 2022-03-23

## 2022-03-23 PROBLEM — E78.5 DYSLIPIDEMIA: Status: ACTIVE | Noted: 2022-03-23

## 2022-04-15 RX ORDER — BIOTIN 1000 MCG
1 TABLET,CHEWABLE ORAL DAILY
COMMUNITY
Start: 2021-10-29 | End: 2022-04-18

## 2022-04-15 RX ORDER — DIPHENHYDRAMINE HCL 25 MG
25 CAPSULE ORAL EVERY 6 HOURS PRN
COMMUNITY
Start: 2021-10-29 | End: 2022-04-18

## 2022-04-15 RX ORDER — NAPROXEN SODIUM 220 MG
220 TABLET ORAL
COMMUNITY
Start: 2021-10-29 | End: 2022-04-18

## 2022-04-18 ENCOUNTER — OFFICE VISIT (OUTPATIENT)
Dept: FAMILY MEDICINE CLINIC | Facility: CLINIC | Age: 62
End: 2022-04-18
Payer: COMMERCIAL

## 2022-04-18 ENCOUNTER — TELEPHONE (OUTPATIENT)
Dept: ADMINISTRATIVE | Facility: OTHER | Age: 62
End: 2022-04-18

## 2022-04-18 VITALS
BODY MASS INDEX: 27 KG/M2 | SYSTOLIC BLOOD PRESSURE: 110 MMHG | WEIGHT: 143 LBS | HEART RATE: 79 BPM | RESPIRATION RATE: 16 BRPM | HEIGHT: 61 IN | DIASTOLIC BLOOD PRESSURE: 70 MMHG | OXYGEN SATURATION: 99 % | TEMPERATURE: 97.7 F

## 2022-04-18 DIAGNOSIS — E78.5 DYSLIPIDEMIA: Primary | ICD-10-CM

## 2022-04-18 DIAGNOSIS — Z00.00 ANNUAL PHYSICAL EXAM: ICD-10-CM

## 2022-04-18 DIAGNOSIS — R73.03 PREDIABETES: ICD-10-CM

## 2022-04-18 PROCEDURE — 3008F BODY MASS INDEX DOCD: CPT | Performed by: FAMILY MEDICINE

## 2022-04-18 PROCEDURE — 99396 PREV VISIT EST AGE 40-64: CPT | Performed by: FAMILY MEDICINE

## 2022-04-18 PROCEDURE — 1036F TOBACCO NON-USER: CPT | Performed by: FAMILY MEDICINE

## 2022-04-18 PROCEDURE — 3725F SCREEN DEPRESSION PERFORMED: CPT | Performed by: FAMILY MEDICINE

## 2022-04-18 RX ORDER — ROSUVASTATIN CALCIUM 10 MG/1
10 TABLET, COATED ORAL DAILY
Qty: 30 TABLET | Refills: 5 | Status: SHIPPED | OUTPATIENT
Start: 2022-04-18

## 2022-04-18 NOTE — PROGRESS NOTES
Assessment/Plan:         Problem List Items Addressed This Visit        Other    Annual physical exam    Prediabetes    Dyslipidemia - Primary     Very high needs meds will start rosuvastatin 10mg po qd                  Subjective: pt here for annual physical has HL not on meds prediabetes recent labs 8/21 pt had lyme  Disease was treated for 1 mo  Amoxicillin now feels better      Patient ID: Eliz Melgar is a 62 y o  female  HPI    The following portions of the patient's history were reviewed and updated as appropriate:   Past Medical History:  She has no past medical history on file ,  _______________________________________________________________________  Medical Problems:  does not have any pertinent problems on file ,  _______________________________________________________________________  Past Surgical History:   has a past surgical history that includes Cholecystectomy  ,  _______________________________________________________________________  Family History:  family history includes Alzheimer's disease in her mother; Hyperlipidemia in her mother; Hypertension in her mother; No Known Problems in her brother, sister, sister, sister, and sister  ,  _______________________________________________________________________  Social History:   reports that she has never smoked  She has never used smokeless tobacco  She reports current alcohol use  She reports that she does not use drugs  ,  _______________________________________________________________________  Allergies:  has No Known Allergies     _______________________________________________________________________  Current Outpatient Medications   Medication Sig Dispense Refill    lifitegrast (Xiidra) 5 % op solution       Biotin 1000 MCG CHEW Chew 1 tablet daily (Patient not taking: Reported on 4/18/2022 )      Cholecalciferol 25 MCG (1000 UT) tablet Take 1,000 Units by mouth daily (Patient not taking: Reported on 4/18/2022 )      cyanocobalamin (VITAMIN B-12) 1000 MCG tablet Take 1,000 mcg by mouth daily (Patient not taking: Reported on 4/18/2022 )      diphenhydrAMINE (BENADRYL) 25 mg capsule Take 25 mg by mouth every 6 (six) hours as needed (Patient not taking: Reported on 4/18/2022 )      fluocinonide (LIDEX) 0 05 % external solution Apply topically to scalp one to two times a day when itchy  (Patient not taking: Reported on 4/18/2022 ) 60 mL 3    ketoconazole (NIZORAL) 2 % cream Apply topically one to two times a day to affected areas of face or back when itchy (Patient not taking: Reported on 4/18/2022 ) 60 g 3    ketoconazole (NIZORAL) 2 % shampoo Apply Shampoo, lather and leave for 5 minutes then rinse  Use two to three times a week  (Patient not taking: Reported on 4/18/2022 ) 120 mL 3    ketotifen (ZADITOR) 0 025 % ophthalmic solution Apply 1 drop to eye every 12 (twelve) hours as needed (Patient not taking: Reported on 4/18/2022 )      mupirocin (BACTROBAN) 2 % ointment Apply to left thumb daily after washing with soap and water  Apply band aid after putting ointment on  (Patient not taking: Reported on 3/1/2022 ) 30 g 3    naproxen sodium (ALEVE) 220 MG tablet Take 220 mg by mouth (Patient not taking: Reported on 4/18/2022 )       No current facility-administered medications for this visit      _______________________________________________________________________  Review of Systems      Objective:  Vitals:    04/18/22 1331   BP: 110/70   BP Location: Left arm   Patient Position: Sitting   Cuff Size: Standard   Pulse: 79   Resp: 16   Temp: 97 7 °F (36 5 °C)   TempSrc: Temporal   SpO2: 99%   Weight: 64 9 kg (143 lb)   Height: 5' 0 5" (1 537 m)     Body mass index is 27 47 kg/m²  Physical Exam  Vitals reviewed  Constitutional:       General: She is not in acute distress  Appearance: Normal appearance  She is well-developed  She is not ill-appearing  HENT:      Head: Normocephalic        Right Ear: Tympanic membrane, ear canal and external ear normal       Left Ear: Tympanic membrane, ear canal and external ear normal       Nose: Nose normal       Mouth/Throat:      Mouth: Mucous membranes are moist       Pharynx: No oropharyngeal exudate  Eyes:      General: Lids are normal  No scleral icterus  Extraocular Movements: Extraocular movements intact  Conjunctiva/sclera: Conjunctivae normal       Pupils: Pupils are equal, round, and reactive to light  Neck:      Thyroid: No thyromegaly  Vascular: No carotid bruit  Cardiovascular:      Rate and Rhythm: Normal rate and regular rhythm  Pulses: Normal pulses  Heart sounds: Normal heart sounds  No murmur heard  No friction rub  Pulmonary:      Effort: Pulmonary effort is normal       Breath sounds: Normal breath sounds  No wheezing, rhonchi or rales  Abdominal:      General: Bowel sounds are normal  There is no distension  Palpations: Abdomen is soft  There is no mass  Tenderness: There is no abdominal tenderness  There is no guarding  Hernia: No hernia is present  Musculoskeletal:         General: Normal range of motion  Cervical back: Normal range of motion and neck supple  Lymphadenopathy:      Cervical: No cervical adenopathy  Skin:     General: Skin is warm and dry  Findings: No rash  Comments: No abnormal appearing moles   Neurological:      General: No focal deficit present  Mental Status: She is alert and oriented to person, place, and time  Mental status is at baseline  Cranial Nerves: No cranial nerve deficit  Sensory: No sensory deficit  Motor: No weakness, tremor or abnormal muscle tone        Coordination: Coordination normal       Gait: Gait normal       Deep Tendon Reflexes: Reflexes normal    Psychiatric:         Mood and Affect: Mood normal          Speech: Speech normal          Behavior: Behavior normal

## 2022-04-18 NOTE — TELEPHONE ENCOUNTER
----- Message from Bhakti Zheng MD sent at 4/18/2022  9:47 AM EDT -----  Mammo 11/5/21 [facility] care everywhere

## 2022-04-19 ENCOUNTER — TELEPHONE (OUTPATIENT)
Dept: ADMINISTRATIVE | Facility: OTHER | Age: 62
End: 2022-04-19

## 2022-04-19 NOTE — TELEPHONE ENCOUNTER
Upon review of the In Basket request we were able to locate, review, and update the patient chart as requested for CRC: Colonoscopy  Any additional questions or concerns should be emailed to the Practice Liaisons via [email or web address] email, please do not reply via In Basket      Thank you  Liliana Tsai, [address]

## 2022-04-19 NOTE — TELEPHONE ENCOUNTER
----- Message from Carmen Cai sent at 4/18/2022  3:48 PM EDT -----  Regarding: care gap request  04/18/22 3:48 PMf    Hello, our patient attached above has had CRC: Colonoscopy completed/performed  Please assist in updating the patient chart by making an External outreach to [address] [place] [address] located in [place]  The date of service was around 2015/2016      Thank you,  Miladis Luevano  [facility]

## 2022-04-21 NOTE — TELEPHONE ENCOUNTER
Upon review of the In Basket request we were able to locate, review, and update the patient chart as requested for Mammogram     Any additional questions or concerns should be emailed to the Practice Liaisons via [email or web address] email, please do not reply via In Basket      Thank you  Law Velasquez

## 2022-05-23 ENCOUNTER — TELEPHONE (OUTPATIENT)
Dept: FAMILY MEDICINE CLINIC | Facility: CLINIC | Age: 62
End: 2022-05-23

## 2022-05-23 NOTE — TELEPHONE ENCOUNTER
Patient was seen last week and said she forgot to tell you that she has dry genitals  Wants to know what she can do about it

## 2022-08-04 ENCOUNTER — OFFICE VISIT (OUTPATIENT)
Dept: OBGYN CLINIC | Facility: CLINIC | Age: 62
End: 2022-08-04
Payer: COMMERCIAL

## 2022-08-04 VITALS
SYSTOLIC BLOOD PRESSURE: 110 MMHG | DIASTOLIC BLOOD PRESSURE: 70 MMHG | BODY MASS INDEX: 27.3 KG/M2 | WEIGHT: 144.6 LBS | HEIGHT: 61 IN

## 2022-08-04 DIAGNOSIS — D50.9 IRON DEFICIENCY ANEMIA, UNSPECIFIED IRON DEFICIENCY ANEMIA TYPE: ICD-10-CM

## 2022-08-04 DIAGNOSIS — N89.8 VAGINAL IRRITATION: ICD-10-CM

## 2022-08-04 DIAGNOSIS — Z80.0 FAMILY HISTORY OF COLON CANCER IN MOTHER: ICD-10-CM

## 2022-08-04 DIAGNOSIS — Z01.411 ENCOUNTER FOR GYNECOLOGICAL EXAMINATION WITH ABNORMAL FINDING: Primary | ICD-10-CM

## 2022-08-04 DIAGNOSIS — Z12.11 SCREEN FOR COLON CANCER: ICD-10-CM

## 2022-08-04 DIAGNOSIS — Z12.31 ENCOUNTER FOR SCREENING MAMMOGRAM FOR MALIGNANT NEOPLASM OF BREAST: ICD-10-CM

## 2022-08-04 DIAGNOSIS — N95.0 POSTMENOPAUSAL BLEEDING: ICD-10-CM

## 2022-08-04 DIAGNOSIS — R73.03 PREDIABETES: ICD-10-CM

## 2022-08-04 DIAGNOSIS — Z78.0 POSTMENOPAUSAL STATE: ICD-10-CM

## 2022-08-04 DIAGNOSIS — E78.5 DYSLIPIDEMIA: ICD-10-CM

## 2022-08-04 PROCEDURE — 0503F POSTPARTUM CARE VISIT: CPT | Performed by: PHYSICIAN ASSISTANT

## 2022-08-04 PROCEDURE — 99386 PREV VISIT NEW AGE 40-64: CPT | Performed by: PHYSICIAN ASSISTANT

## 2022-08-04 NOTE — PROGRESS NOTES
ASSESSMENT & PLAN: Vicki Baez is a 62 y o  [id number] with abnormal gynecologic exam     1   Routine well woman exam done today  2  Pap and HPV:  The patient's last pap and hpv was 2020  It was normal     Pap and cotesting was done today  Current ASCCP Guidelines reviewed  3   Mammogram ordered, due in november 4  Colonoscopy referral provided - highly encouraged, pt possible overdue and recent dx colon CA in mother  5  Recommend considering repeating DEXA next year, pt w/ mild osteopenia  Encouraged   6  The following were reviewed in today's visit: breast self exam, mammography screening ordered, adequate intake of calcium and vitamin D, exercise, healthy diet and colonoscopy discussed and ordered  7  Pt c/o vaginal dryness and irritation, chronic problem w/o tx but seeming to slowly get worse  Likely atrophic vaginitis, will r/o acute infection since on abx recently w/ AFFIRM  Discussed trial of coconut oil/silicone based lubricant as moisturizer, Estrace vaginal cream  Discussed risks/benefits, side effects, clarified some concern for use of estrogen  Pt to consider initial conservative topical first and if no improvement in 4-6 weeks, will consider trial estrace  No vulavr lesions or patches, however if no response to any tx, neg infection, consider vulvar biopsy  Pt had mentioned a few infrequent episodes of noting pink or brown discharge on liner, last episode a few weeks ago only once  States cleaned herself and has not had anything since  Denies pelvic pain, significantl vaginal bleeding or clots  Denies specifically noting it after sex  I would advise looking into this further, as any type of blood vaginally needs to have further evaluation for intrauterine concern  Will order pelvic US to eval  Endometrial thickness, and especially if pt has any other episodes of spotting/bleeding from vagina , should have EMBx  Pt also noted not taking crestor,  last year   She would like lipid panel checked prior to restarting medication as she states she has been working on her diet and exercise  She was not aware PCP ordered lipid panel in April  Also I see prediabetes In problem list, pt also interested in iron and CBC, vit D checked  Order for CMP, iron panel, CBC, vit D w/ lipid panel given, to be fasting  RTO in 1 year annual, will plan sooner f/u depending on symptom response to tx and if estrace to be started  Pt understands and agreeable to plan  CC:  Annual Gynecologic Examination    HPI: Teresa Medel is a 62 y o  [id number] who presents for annual gynecologic examination  She is a new patient w/ us  She has the following concerns:  Reports vaginal dryness x 3 years, but getting worse x 6 months  Past month or so is worst sxs  Associated itchiness/irritation generalized inner labia, vaginal opening and into vagina  She uses OTC vagisil which can help sxs temporarily  No abnormal discharge  She does note recently on abx for tooth infection  She has not tried any antifungal OTC  She denies any vaginal odor, abnormal discharge or pelvic pain  With same partner x many years  Denies dyspareunia  She has hyperlipidemia but declines to take crestor  She wishes to get BW done first before restarting, as she states she has been eating better and exercises  Pt states hx of gestational diabetes, prediabetes listed in problem list, last glucose 1 year ago 101  Would like BW  Also was told in [facility] her iron is low  States has been taking iron pills, and wants levels rechecked  Also vit D  No LMP recorded (lmp unknown)  Patient is postmenopausal     Post-menopausal bleeding:  Denies, however states has noticed a few episodes of what seems to be slightly bloody discharge on panty liner, slight pink-red or brown  States no actual vaginal bleeding  It is not persistent or lasting days, may only be noticed once every few months       Bowel or bladder changes: none      Health Maintenance:      She does perform regular monthly self breast exams  Denies breast pain, lumps, skin changes or nipple discharge  She feels safe at home  Feels safe with partner  Last Pap: per office note from [address] primary 10/2021, normal 2020, neg HPV  Last mammogram: 2021 - [facility], normal   Last colonoscopy: ? DEXA: care everywhere 3/16/2021, T score -1 7 lumbar, total hip -0 8, femoral neck -1 5, osteopenia  Past Medical History:   Diagnosis Date    Gestational diabetes     Hyperlipemia        Past Surgical History:   Procedure Laterality Date    CHOLECYSTECTOMY         Past OB/Gyn History:  OB History        0    Para        Term                AB   0    Living   2       SAB   0    IAB   0    Ectopic   0    Multiple        Live Births   2               History of abnormal pap smears: No     Patient is currently sexually active  Family History   Problem Relation Age of Onset    Hypertension Mother     Hyperlipidemia Mother    Miracle Barth Alzheimer's disease Mother    Miracle Barth Colon cancer Mother     Lung cancer Mother     No Known Problems Sister     No Known Problems Sister     No Known Problems Sister     No Known Problems Sister     No Known Problems Brother        Family history of Breast/Uterine/Ovarian/Colon Cancer: as above  Reports mom diagnosed w/ colon CA earlier this year  Social History:  Social History     Socioeconomic History    Marital status: /Civil Union     Spouse name: Not on file    Number of children: Not on file    Years of education: Not on file    Highest education level: Not on file   Occupational History    Not on file   Tobacco Use    Smoking status: Never Smoker    Smokeless tobacco: Never Used   Vaping Use    Vaping Use: Never used   Substance and Sexual Activity    Alcohol use:  Yes    Drug use: Never    Sexual activity: Yes     Partners: Male     Birth control/protection: None   Other Topics Concern    Not on file   Social History Narrative    Not on file     Social Determinants of Health     Financial Resource Strain: Not on file   Food Insecurity: Not on file   Transportation Needs: Not on file   Physical Activity: Not on file   Stress: Not on file   Social Connections: Not on file   Intimate Partner Violence: Not on file   Housing Stability: Not on file       No Known Allergies    Current Outpatient Medications:     lifitegrast (XIIDRA) 5 % op solution, , Disp: , Rfl:     ketotifen (ZADITOR) 0 025 % ophthalmic solution, Apply 1 drop to eye every 12 (twelve) hours as needed (Patient not taking: No sig reported), Disp: , Rfl:     rosuvastatin (CRESTOR) 10 MG tablet, Take 1 tablet (10 mg total) by mouth daily (Patient not taking: Reported on 8/4/2022), Disp: 30 tablet, Rfl: 5      Review of Systems  Constitutional :no fever, feels well, no tiredness, no recent weight gain or loss  ENT: no ear ache, no loss of hearing, no nosebleeds or nasal discharge, no sore throat or hoarseness  Cardiovascular: no complaints of slow or fast heart beat, no chest pain, no palpitations, no leg claudication or lower extremity edema  Respiratory: no complaints of shortness of shortness of breath, no VELAZCO  Breasts:no complaints of breast pain, breast lump, or nipple discharge  Gastrointestinal: no complaints of abdominal pain, constipation, nausea, vomiting, or diarrhea or bloody stools  Genitourinary : as above; no complaints of dysuria, incontinence, pelvic pain, no dysmenorrhea, vaginal discharge/odor  Musculoskeletal: no complaints of arthralgia, no myalgia, no joint swelling or stiffness, no limb pain or swelling    Integumentary: no complaints of skin rash or lesion, itching or dry skin  Neurological: no complaints of headache, no confusion, no numbness or tingling, no dizziness or fainting  MH: denies unstable depression/anxiety    Objective      /70 (BP Location: Left arm, Patient Position: Sitting, Cuff Size: Standard)   Ht 5' 1" (1 549 m)   Wt 65 6 kg (144 lb 9 6 oz)   LMP  (LMP Unknown)   BMI 27 32 kg/m²     General:   appears stated age, cooperative, alert normal mood and affect  BMI 27 32   Neck: normal, supple,trachea midline, no masses  Thyroid palpated normal     Heart: regular rate and rhythm, S1, S2 normal, no murmur, click, rub or gallop   Lungs: clear to auscultation bilaterally   Breasts: normal appearance, no masses or tenderness, No nipple retraction or dimpling, No nipple discharge or bleeding, No axillary or supraclavicular adenopathy, Normal to palpation without dominant masses   Abdomen: soft, non-tender, without masses or organomegaly   Vulva: no lesions, general dryness and mild PM atrophy noted  No erythema, swelling, white patches or concerning rash/lesions  Vagina: mild atrophy, no dischargem exudate, erythema, blood or lesions  Urethra: normal   Cervix: Normal, no discharge  PAP done  Nontender  Uterus: normal and nontender   Adnexa: no mass, fullness, tenderness   Lymphatic palpation of lymph nodes in neck, axilla, groin and/or other locations: no lymphadenopathy or masses noted   Skin normal skin turgor and no rashes     Psychiatric orientation to person, place, and time: normal  mood and affect: normal

## 2022-08-05 PROCEDURE — 87480 CANDIDA DNA DIR PROBE: CPT | Performed by: PHYSICIAN ASSISTANT

## 2022-08-05 PROCEDURE — 87510 GARDNER VAG DNA DIR PROBE: CPT | Performed by: PHYSICIAN ASSISTANT

## 2022-08-05 PROCEDURE — G0476 HPV COMBO ASSAY CA SCREEN: HCPCS | Performed by: PHYSICIAN ASSISTANT

## 2022-08-05 PROCEDURE — G0145 SCR C/V CYTO,THINLAYER,RESCR: HCPCS | Performed by: PHYSICIAN ASSISTANT

## 2022-08-05 PROCEDURE — 87660 TRICHOMONAS VAGIN DIR PROBE: CPT | Performed by: PHYSICIAN ASSISTANT

## 2022-08-06 ENCOUNTER — APPOINTMENT (OUTPATIENT)
Dept: LAB | Facility: CLINIC | Age: 62
End: 2022-08-06
Payer: COMMERCIAL

## 2022-08-06 DIAGNOSIS — Z78.0 POSTMENOPAUSAL STATE: ICD-10-CM

## 2022-08-06 DIAGNOSIS — R73.03 PREDIABETES: ICD-10-CM

## 2022-08-06 DIAGNOSIS — D50.9 IRON DEFICIENCY ANEMIA, UNSPECIFIED IRON DEFICIENCY ANEMIA TYPE: ICD-10-CM

## 2022-08-06 DIAGNOSIS — E78.5 DYSLIPIDEMIA: ICD-10-CM

## 2022-08-06 LAB
25(OH)D3 SERPL-MCNC: 34.2 NG/ML (ref 30–100)
ALBUMIN SERPL BCP-MCNC: 4.1 G/DL (ref 3.5–5)
ALP SERPL-CCNC: 65 U/L (ref 34–104)
ALT SERPL W P-5'-P-CCNC: 32 U/L (ref 7–52)
ANION GAP SERPL CALCULATED.3IONS-SCNC: 7 MMOL/L (ref 4–13)
AST SERPL W P-5'-P-CCNC: 26 U/L (ref 13–39)
BASOPHILS # BLD AUTO: 0.05 THOUSANDS/ΜL (ref 0–0.1)
BASOPHILS NFR BLD AUTO: 1 % (ref 0–1)
BILIRUB SERPL-MCNC: 0.58 MG/DL (ref 0.2–1)
BUN SERPL-MCNC: 12 MG/DL (ref 5–25)
CALCIUM SERPL-MCNC: 9.5 MG/DL (ref 8.4–10.2)
CANDIDA RRNA VAG QL PROBE: NEGATIVE
CHLORIDE SERPL-SCNC: 103 MMOL/L (ref 96–108)
CHOLEST SERPL-MCNC: 261 MG/DL
CO2 SERPL-SCNC: 27 MMOL/L (ref 21–32)
CREAT SERPL-MCNC: 0.74 MG/DL (ref 0.6–1.3)
EOSINOPHIL # BLD AUTO: 0.2 THOUSAND/ΜL (ref 0–0.61)
EOSINOPHIL NFR BLD AUTO: 3 % (ref 0–6)
ERYTHROCYTE [DISTWIDTH] IN BLOOD BY AUTOMATED COUNT: 13.1 % (ref 11.6–15.1)
EST. AVERAGE GLUCOSE BLD GHB EST-MCNC: 111 MG/DL
FERRITIN SERPL-MCNC: 18 NG/ML (ref 8–388)
G VAGINALIS RRNA GENITAL QL PROBE: NEGATIVE
GFR SERPL CREATININE-BSD FRML MDRD: 88 ML/MIN/1.73SQ M
GLUCOSE P FAST SERPL-MCNC: 88 MG/DL (ref 65–99)
HBA1C MFR BLD: 5.5 %
HCT VFR BLD AUTO: 40.7 % (ref 34.8–46.1)
HDLC SERPL-MCNC: 55 MG/DL
HGB BLD-MCNC: 13.2 G/DL (ref 11.5–15.4)
IMM GRANULOCYTES # BLD AUTO: 0.01 THOUSAND/UL (ref 0–0.2)
IMM GRANULOCYTES NFR BLD AUTO: 0 % (ref 0–2)
IRON SERPL-MCNC: 105 UG/DL (ref 50–170)
LDLC SERPL CALC-MCNC: 166 MG/DL (ref 0–100)
LYMPHOCYTES # BLD AUTO: 1.98 THOUSANDS/ΜL (ref 0.6–4.47)
LYMPHOCYTES NFR BLD AUTO: 32 % (ref 14–44)
MCH RBC QN AUTO: 28.6 PG (ref 26.8–34.3)
MCHC RBC AUTO-ENTMCNC: 32.4 G/DL (ref 31.4–37.4)
MCV RBC AUTO: 88 FL (ref 82–98)
MONOCYTES # BLD AUTO: 0.4 THOUSAND/ΜL (ref 0.17–1.22)
MONOCYTES NFR BLD AUTO: 7 % (ref 4–12)
NEUTROPHILS # BLD AUTO: 3.48 THOUSANDS/ΜL (ref 1.85–7.62)
NEUTS SEG NFR BLD AUTO: 57 % (ref 43–75)
NONHDLC SERPL-MCNC: 206 MG/DL
NRBC BLD AUTO-RTO: 0 /100 WBCS
PLATELET # BLD AUTO: 323 THOUSANDS/UL (ref 149–390)
PMV BLD AUTO: 9.4 FL (ref 8.9–12.7)
POTASSIUM SERPL-SCNC: 4.3 MMOL/L (ref 3.5–5.3)
PROT SERPL-MCNC: 6.9 G/DL (ref 6.4–8.4)
RBC # BLD AUTO: 4.62 MILLION/UL (ref 3.81–5.12)
SODIUM SERPL-SCNC: 137 MMOL/L (ref 135–147)
T VAGINALIS RRNA GENITAL QL PROBE: NEGATIVE
TIBC SERPL-MCNC: 421 UG/DL (ref 250–450)
TRIGL SERPL-MCNC: 200 MG/DL
WBC # BLD AUTO: 6.12 THOUSAND/UL (ref 4.31–10.16)

## 2022-08-06 PROCEDURE — 83540 ASSAY OF IRON: CPT

## 2022-08-06 PROCEDURE — 82728 ASSAY OF FERRITIN: CPT

## 2022-08-06 PROCEDURE — 83550 IRON BINDING TEST: CPT

## 2022-08-06 PROCEDURE — 80053 COMPREHEN METABOLIC PANEL: CPT

## 2022-08-06 PROCEDURE — 85025 COMPLETE CBC W/AUTO DIFF WBC: CPT

## 2022-08-06 PROCEDURE — 83036 HEMOGLOBIN GLYCOSYLATED A1C: CPT

## 2022-08-06 PROCEDURE — 36415 COLL VENOUS BLD VENIPUNCTURE: CPT

## 2022-08-06 PROCEDURE — 82306 VITAMIN D 25 HYDROXY: CPT

## 2022-08-06 PROCEDURE — 80061 LIPID PANEL: CPT

## 2022-08-08 LAB
HPV HR 12 DNA CVX QL NAA+PROBE: NEGATIVE
HPV16 DNA CVX QL NAA+PROBE: NEGATIVE
HPV18 DNA CVX QL NAA+PROBE: NEGATIVE

## 2022-08-11 LAB
LAB AP GYN PRIMARY INTERPRETATION: NORMAL
Lab: NORMAL

## 2022-08-16 DIAGNOSIS — E78.5 DYSLIPIDEMIA: Primary | ICD-10-CM

## 2022-08-16 DIAGNOSIS — E78.5 DYSLIPIDEMIA: ICD-10-CM

## 2022-08-16 RX ORDER — ROSUVASTATIN CALCIUM 10 MG/1
10 TABLET, COATED ORAL DAILY
Qty: 30 TABLET | Refills: 5 | Status: SHIPPED | OUTPATIENT
Start: 2022-08-16

## 2022-09-23 ENCOUNTER — TELEPHONE (OUTPATIENT)
Dept: GASTROENTEROLOGY | Facility: CLINIC | Age: 62
End: 2022-09-23

## 2022-09-23 ENCOUNTER — CONSULT (OUTPATIENT)
Dept: GASTROENTEROLOGY | Facility: CLINIC | Age: 62
End: 2022-09-23
Payer: COMMERCIAL

## 2022-09-23 VITALS
HEART RATE: 77 BPM | BODY MASS INDEX: 27.19 KG/M2 | DIASTOLIC BLOOD PRESSURE: 69 MMHG | WEIGHT: 144 LBS | SYSTOLIC BLOOD PRESSURE: 105 MMHG | HEIGHT: 61 IN

## 2022-09-23 DIAGNOSIS — Z86.010 HISTORY OF COLONIC POLYPS: ICD-10-CM

## 2022-09-23 DIAGNOSIS — Z80.0 FAMILY HISTORY OF COLON CANCER IN MOTHER: Primary | ICD-10-CM

## 2022-09-23 DIAGNOSIS — Z80.0 FAMILY HISTORY- STOMACH CANCER: ICD-10-CM

## 2022-09-23 PROBLEM — K63.5 COLON POLYP: Status: ACTIVE | Noted: 2022-09-23

## 2022-09-23 PROCEDURE — 99243 OFF/OP CNSLTJ NEW/EST LOW 30: CPT | Performed by: INTERNAL MEDICINE

## 2022-09-23 RX ORDER — VARENICLINE 0.03 MG/.05ML
SPRAY NASAL
COMMUNITY

## 2022-09-23 NOTE — TELEPHONE ENCOUNTER
Scheduled date of colonoscopy (as of today): 10/12/22  Physician performing colonoscopy: Dr Armida Castillo   Location of colonoscopy: [facility]  Bowel prep reviewed with patient: Miralax w/ dul   Instructions reviewed with patient by: garrison   Clearances: n/a

## 2022-09-23 NOTE — PROGRESS NOTES
[facility] - Outpatient Consultation  [facility] 65 y o  female MRN: [id number]  Encounter: [id number]          ASSESSMENT AND PLAN:      1  History of colonic polyps  Last colonoscopy 2013  Mother recently diagnosed with colon cancer  We will schedule colonoscopy and keep you posted of the progress  - Colonoscopy; Future    2  Family history of colon cancer in mother  EGD 4 in the rectum  - Ambulatory Referral to Gastroenterology  - Colonoscopy; Future    3  Family history- stomach cancer  Maternal grandmother  Patient will otherwise follow-up as needed    ______________________________________________________________________    HPI:  Very pleasant 70-year-old lady presents to schedule colonoscopy  She had a colonoscopy in 2013 small polyp was taken out we believe was hyperplastic she was told return in 5 years she presents now  Additionally in the recent past her mother was diagnosed with colon cancer she is concerned over that  Any other GI malignancies the were query it in she stated maternal grandmother had stomach cancer  Otherwise no hepatitis pancreatitis inflammatory bowel disease celiac disease or other cancers  She has no upper lower GI symptoms  We discussed the process of colonoscopy in prep  REVIEW OF SYSTEMS:    CONSTITUTIONAL: Denies any fever, chills, rigors, and weight loss  HEENT: No earache or tinnitus  Denies hearing loss or visual disturbances  CARDIOVASCULAR: No chest pain or palpitations  RESPIRATORY: Denies any cough, hemoptysis, shortness of breath or dyspnea on exertion  GASTROINTESTINAL: As noted in the History of Present Illness  GENITOURINARY: No problems with urination  Denies any hematuria or dysuria  NEUROLOGIC: No dizziness or vertigo, denies headaches  MUSCULOSKELETAL: Denies any muscle or joint pain  SKIN: Denies skin rashes or itching  ENDOCRINE: Denies excessive thirst  Denies intolerance to heat or cold    PSYCHOSOCIAL: Denies depression or anxiety  Denies any recent memory loss  Historical Information   Past Medical History:   Diagnosis Date    Gestational diabetes     Hyperlipemia      Past Surgical History:   Procedure Laterality Date    CHOLECYSTECTOMY       Social History   Social History     Substance and Sexual Activity   Alcohol Use Yes     Social History     Substance and Sexual Activity   Drug Use Never     Social History     Tobacco Use   Smoking Status Never Smoker   Smokeless Tobacco Never Used     Family History   Problem Relation Age of Onset    Hypertension Mother     Hyperlipidemia Mother     Alzheimer's disease Mother     Colon cancer Mother     Lung cancer Mother     No Known Problems Sister     No Known Problems Sister     No Known Problems Sister     No Known Problems Sister     No Known Problems Brother        Meds/Allergies       Current Outpatient Medications:     rosuvastatin (CRESTOR) 10 MG tablet    ketotifen (ZADITOR) 0 025 % ophthalmic solution    lifitegrast (XIIDRA) 5 % op solution    No Known Allergies        Objective     Blood pressure 105/69, pulse 77, height 5' 1" (1 549 m), weight 65 3 kg (144 lb)  Body mass index is 27 21 kg/m²  PHYSICAL EXAM:      General Appearance:   Alert, cooperative, no distress   HEENT:   Normocephalic, atraumatic, anicteric      Neck:  Supple, symmetrical, trachea midline   Lungs:   Clear to auscultation bilaterally; no rales, rhonchi or wheezing; respirations unlabored    Heart[de-identified]   Regular rate and rhythm; no murmur, rub, or gallop  Abdomen:   Soft, non-tender, non-distended; normal bowel sounds; no masses, no organomegaly    Genitalia:   Deferred    Rectal:   Deferred    Extremities:  No cyanosis, clubbing or edema    Pulses:  2+ and symmetric    Skin:  No jaundice, rashes, or lesions    Lymph nodes:  No palpable cervical lymphadenopathy        Lab Results:   No visits with results within 1 Day(s) from this visit     Latest known visit with results is:   Appointment on 08/06/2022   Component Date Value    Hemoglobin A1C 08/06/2022 5 5     EAG 08/06/2022 111     Sodium 08/06/2022 137     Potassium 08/06/2022 4 3     Chloride 08/06/2022 103     CO2 08/06/2022 27     ANION GAP 08/06/2022 7     BUN 08/06/2022 12     Creatinine 08/06/2022 0 74     Glucose, Fasting 08/06/2022 88     Calcium 08/06/2022 9 5     AST 08/06/2022 26     ALT 08/06/2022 32     Alkaline Phosphatase 08/06/2022 65     Total Protein 08/06/2022 6 9     Albumin 08/06/2022 4 1     Total Bilirubin 08/06/2022 0 58     eGFR 08/06/2022 88     WBC 08/06/2022 6 12     RBC 08/06/2022 4 62     Hemoglobin 08/06/2022 13 2     Hematocrit 08/06/2022 40 7     MCV 08/06/2022 88     MCH 08/06/2022 28 6     MCHC 08/06/2022 32 4     RDW 08/06/2022 13 1     MPV 08/06/2022 9 4     Platelets 12/89/6865 323     nRBC 08/06/2022 0     Neutrophils Relative 08/06/2022 57     Immat GRANS % 08/06/2022 0     Lymphocytes Relative 08/06/2022 32     Monocytes Relative 08/06/2022 7     Eosinophils Relative 08/06/2022 3     Basophils Relative 08/06/2022 1     Neutrophils Absolute 08/06/2022 3 48     Immature Grans Absolute 08/06/2022 0 01     Lymphocytes Absolute 08/06/2022 1 98     Monocytes Absolute 08/06/2022 0 40     Eosinophils Absolute 08/06/2022 0 20     Basophils Absolute 08/06/2022 0 05     Vit D, 25-Hydroxy 08/06/2022 34 2     Ferritin 08/06/2022 18     TIBC 08/06/2022 421     Iron 08/06/2022 105     Cholesterol 08/06/2022 261 (A)    Triglycerides 08/06/2022 200 (A)    HDL, Direct 08/06/2022 55     LDL Calculated 08/06/2022 166 (A)    Non-HDL-Chol (CHOL-HDL) 08/06/2022 206          Radiology Results:   No results found

## 2022-09-23 NOTE — H&P (VIEW-ONLY)
[facility] - Outpatient Consultation  [facility] 65 y o  female MRN: [id number]  Encounter: [id number]          ASSESSMENT AND PLAN:      1  History of colonic polyps  Last colonoscopy 2013  Mother recently diagnosed with colon cancer  We will schedule colonoscopy and keep you posted of the progress  - Colonoscopy; Future    2  Family history of colon cancer in mother  EGD 4 in the rectum  - Ambulatory Referral to Gastroenterology  - Colonoscopy; Future    3  Family history- stomach cancer  Maternal grandmother  Patient will otherwise follow-up as needed    ______________________________________________________________________    HPI:  Very pleasant 59-year-old lady presents to schedule colonoscopy  She had a colonoscopy in 2013 small polyp was taken out we believe was hyperplastic she was told return in 5 years she presents now  Additionally in the recent past her mother was diagnosed with colon cancer she is concerned over that  Any other GI malignancies the were query it in she stated maternal grandmother had stomach cancer  Otherwise no hepatitis pancreatitis inflammatory bowel disease celiac disease or other cancers  She has no upper lower GI symptoms  We discussed the process of colonoscopy in prep  REVIEW OF SYSTEMS:    CONSTITUTIONAL: Denies any fever, chills, rigors, and weight loss  HEENT: No earache or tinnitus  Denies hearing loss or visual disturbances  CARDIOVASCULAR: No chest pain or palpitations  RESPIRATORY: Denies any cough, hemoptysis, shortness of breath or dyspnea on exertion  GASTROINTESTINAL: As noted in the History of Present Illness  GENITOURINARY: No problems with urination  Denies any hematuria or dysuria  NEUROLOGIC: No dizziness or vertigo, denies headaches  MUSCULOSKELETAL: Denies any muscle or joint pain  SKIN: Denies skin rashes or itching  ENDOCRINE: Denies excessive thirst  Denies intolerance to heat or cold    PSYCHOSOCIAL: Denies depression or anxiety  Denies any recent memory loss  Historical Information   Past Medical History:   Diagnosis Date   • Gestational diabetes    • Hyperlipemia      Past Surgical History:   Procedure Laterality Date   • CHOLECYSTECTOMY       Social History   Social History     Substance and Sexual Activity   Alcohol Use Yes     Social History     Substance and Sexual Activity   Drug Use Never     Social History     Tobacco Use   Smoking Status Never Smoker   Smokeless Tobacco Never Used     Family History   Problem Relation Age of Onset   • Hypertension Mother    • Hyperlipidemia Mother    • Alzheimer's disease Mother    • Colon cancer Mother    • Lung cancer Mother    • No Known Problems Sister    • No Known Problems Sister    • No Known Problems Sister    • No Known Problems Sister    • No Known Problems Brother        Meds/Allergies       Current Outpatient Medications:   •  rosuvastatin (CRESTOR) 10 MG tablet  •  ketotifen (ZADITOR) 0 025 % ophthalmic solution  •  lifitegrast (XIIDRA) 5 % op solution    No Known Allergies        Objective     Blood pressure 105/69, pulse 77, height 5' 1" (1 549 m), weight 65 3 kg (144 lb)  Body mass index is 27 21 kg/m²  PHYSICAL EXAM:      General Appearance:   Alert, cooperative, no distress   HEENT:   Normocephalic, atraumatic, anicteric      Neck:  Supple, symmetrical, trachea midline   Lungs:   Clear to auscultation bilaterally; no rales, rhonchi or wheezing; respirations unlabored    Heart[de-identified]   Regular rate and rhythm; no murmur, rub, or gallop  Abdomen:   Soft, non-tender, non-distended; normal bowel sounds; no masses, no organomegaly    Genitalia:   Deferred    Rectal:   Deferred    Extremities:  No cyanosis, clubbing or edema    Pulses:  2+ and symmetric    Skin:  No jaundice, rashes, or lesions    Lymph nodes:  No palpable cervical lymphadenopathy        Lab Results:   No visits with results within 1 Day(s) from this visit     Latest known visit with results is:   Appointment on 08/06/2022   Component Date Value   • Hemoglobin A1C 08/06/2022 5 5    • EAG 08/06/2022 111    • Sodium 08/06/2022 137    • Potassium 08/06/2022 4 3    • Chloride 08/06/2022 103    • CO2 08/06/2022 27    • ANION GAP 08/06/2022 7    • BUN 08/06/2022 12    • Creatinine 08/06/2022 0 74    • Glucose, Fasting 08/06/2022 88    • Calcium 08/06/2022 9 5    • AST 08/06/2022 26    • ALT 08/06/2022 32    • Alkaline Phosphatase 08/06/2022 65    • Total Protein 08/06/2022 6 9    • Albumin 08/06/2022 4 1    • Total Bilirubin 08/06/2022 0 58    • eGFR 08/06/2022 88    • WBC 08/06/2022 6 12    • RBC 08/06/2022 4 62    • Hemoglobin 08/06/2022 13 2    • Hematocrit 08/06/2022 40 7    • MCV 08/06/2022 88    • MCH 08/06/2022 28 6    • MCHC 08/06/2022 32 4    • RDW 08/06/2022 13 1    • MPV 08/06/2022 9 4    • Platelets 85/87/2812 323    • nRBC 08/06/2022 0    • Neutrophils Relative 08/06/2022 57    • Immat GRANS % 08/06/2022 0    • Lymphocytes Relative 08/06/2022 32    • Monocytes Relative 08/06/2022 7    • Eosinophils Relative 08/06/2022 3    • Basophils Relative 08/06/2022 1    • Neutrophils Absolute 08/06/2022 3 48    • Immature Grans Absolute 08/06/2022 0 01    • Lymphocytes Absolute 08/06/2022 1 98    • Monocytes Absolute 08/06/2022 0 40    • Eosinophils Absolute 08/06/2022 0 20    • Basophils Absolute 08/06/2022 0 05    • Vit D, 25-Hydroxy 08/06/2022 34 2    • Ferritin 08/06/2022 18    • TIBC 08/06/2022 421    • Iron 08/06/2022 105    • Cholesterol 08/06/2022 261 (A)   • Triglycerides 08/06/2022 200 (A)   • HDL, Direct 08/06/2022 55    • LDL Calculated 08/06/2022 166 (A)   • Non-HDL-Chol (CHOL-HDL) 08/06/2022 206          Radiology Results:   No results found

## 2022-10-11 ENCOUNTER — TELEPHONE (OUTPATIENT)
Dept: SURGERY | Facility: HOSPITAL | Age: 62
End: 2022-10-11

## 2022-10-12 ENCOUNTER — HOSPITAL ENCOUNTER (OUTPATIENT)
Dept: GASTROENTEROLOGY | Facility: HOSPITAL | Age: 62
Setting detail: OUTPATIENT SURGERY
Discharge: HOME | End: 2022-10-12
Attending: INTERNAL MEDICINE
Payer: COMMERCIAL

## 2022-10-12 ENCOUNTER — ANESTHESIA EVENT (OUTPATIENT)
Dept: GASTROENTEROLOGY | Facility: HOSPITAL | Age: 62
End: 2022-10-12

## 2022-10-12 ENCOUNTER — ANESTHESIA (OUTPATIENT)
Dept: GASTROENTEROLOGY | Facility: HOSPITAL | Age: 62
End: 2022-10-12

## 2022-10-12 VITALS
BODY MASS INDEX: 26.89 KG/M2 | DIASTOLIC BLOOD PRESSURE: 76 MMHG | RESPIRATION RATE: 18 BRPM | OXYGEN SATURATION: 98 % | TEMPERATURE: 97.6 F | WEIGHT: 142.3 LBS | HEART RATE: 72 BPM | SYSTOLIC BLOOD PRESSURE: 106 MMHG

## 2022-10-12 DIAGNOSIS — Z86.010 HISTORY OF COLONIC POLYPS: ICD-10-CM

## 2022-10-12 DIAGNOSIS — Z80.0 FAMILY HISTORY OF COLON CANCER IN MOTHER: ICD-10-CM

## 2022-10-12 PROCEDURE — 45385 COLONOSCOPY W/LESION REMOVAL: CPT | Performed by: INTERNAL MEDICINE

## 2022-10-12 PROCEDURE — 88305 TISSUE EXAM BY PATHOLOGIST: CPT | Performed by: PATHOLOGY

## 2022-10-12 RX ORDER — PROPOFOL 10 MG/ML
INJECTION, EMULSION INTRAVENOUS AS NEEDED
Status: DISCONTINUED | OUTPATIENT
Start: 2022-10-12 | End: 2022-10-12

## 2022-10-12 RX ORDER — LIDOCAINE HYDROCHLORIDE 10 MG/ML
INJECTION, SOLUTION EPIDURAL; INFILTRATION; INTRACAUDAL; PERINEURAL AS NEEDED
Status: DISCONTINUED | OUTPATIENT
Start: 2022-10-12 | End: 2022-10-12

## 2022-10-12 RX ORDER — SODIUM CHLORIDE, SODIUM LACTATE, POTASSIUM CHLORIDE, CALCIUM CHLORIDE 600; 310; 30; 20 MG/100ML; MG/100ML; MG/100ML; MG/100ML
INJECTION, SOLUTION INTRAVENOUS CONTINUOUS PRN
Status: DISCONTINUED | OUTPATIENT
Start: 2022-10-12 | End: 2022-10-12

## 2022-10-12 RX ADMIN — PROPOFOL 50 MG: 10 INJECTION, EMULSION INTRAVENOUS at 13:36

## 2022-10-12 RX ADMIN — PROPOFOL 80 MG: 10 INJECTION, EMULSION INTRAVENOUS at 13:31

## 2022-10-12 RX ADMIN — PROPOFOL 30 MG: 10 INJECTION, EMULSION INTRAVENOUS at 13:33

## 2022-10-12 RX ADMIN — SODIUM CHLORIDE, SODIUM LACTATE, POTASSIUM CHLORIDE, AND CALCIUM CHLORIDE: .6; .31; .03; .02 INJECTION, SOLUTION INTRAVENOUS at 13:27

## 2022-10-12 RX ADMIN — PROPOFOL 30 MG: 10 INJECTION, EMULSION INTRAVENOUS at 13:44

## 2022-10-12 RX ADMIN — LIDOCAINE HYDROCHLORIDE 50 MG: 10 INJECTION, SOLUTION EPIDURAL; INFILTRATION; INTRACAUDAL; PERINEURAL at 13:31

## 2022-10-12 NOTE — TELEPHONE ENCOUNTER
[facility] office and notified them to please reach out to the patient   Terri Shah will call the patient

## 2022-10-12 NOTE — ANESTHESIA PREPROCEDURE EVALUATION
Procedure:  COLONOSCOPY    Relevant Problems   CARDIO   (+) Hyperlipidemia        Physical Exam    Airway    Mallampati score: II  TM Distance: >3 FB  Neck ROM: full     Dental   No notable dental hx     Cardiovascular  Cardiovascular exam normal    Pulmonary  Pulmonary exam normal     Other Findings        Anesthesia Plan  ASA Score- 2     Anesthesia Type- IV sedation with anesthesia with ASA Monitors  Additional Monitors:   Airway Plan:           Plan Factors-Exercise tolerance (METS): >4 METS  Chart reviewed  Imaging results reviewed  Existing labs reviewed  Patient summary reviewed  Patient is not a current smoker  Induction-     Postoperative Plan-     Informed Consent- Anesthetic plan and risks discussed with patient  I personally reviewed this patient with the CRNA  Discussed and agreed on the Anesthesia Plan with the CRNA  Jonathan Downs

## 2022-10-12 NOTE — TELEPHONE ENCOUNTER
10/12/22 - Pt left 2 messages on [phone]  6:49 pm and 8:19 pm, saying she hadn't heard from anyone re her colonoscopy scheduled for today with Dr Zoraida Langston

## 2022-10-12 NOTE — INTERVAL H&P NOTE
H&P reviewed  After examining the patient I find no changes in the patients condition since the H&P had been written      Vitals:    10/12/22 1245   BP: 113/59   Pulse: 66   Resp: (!) 10   Temp: (!) 97 2 °F (36 2 °C)   SpO2: 96%

## 2022-10-12 NOTE — ANESTHESIA POSTPROCEDURE EVALUATION
Post-Op Assessment Note    CV Status:  Stable    Pain management: adequate     Mental Status:  Alert and awake   Hydration Status:  Stable   PONV Controlled:  None   Airway Patency:  Patent      Post Op Vitals Reviewed: Yes      Staff: Anesthesiologist, CRNA         No complications documented      BP   107/62   Temp  97 6   Pulse 78   Resp 16   SpO2 96% on RA

## 2022-10-24 PROCEDURE — 88305 TISSUE EXAM BY PATHOLOGIST: CPT | Performed by: PATHOLOGY

## 2022-11-23 ENCOUNTER — TELEPHONE (OUTPATIENT)
Dept: OBGYN CLINIC | Facility: CLINIC | Age: 62
End: 2022-11-23

## 2022-11-23 NOTE — TELEPHONE ENCOUNTER
I called and spoke with pt - she was very upset regarding the charges for her lab work from her insurance company  She stated in past was coded as screenings and has never had this problem  I explained to pt that as discussed at visit, pt requested iron and hgb to be checked and vit D because she had vit D def  and iron anemia previously  Also prediabetes in problem list and appears last A1C in 2021 was in prediabetic range  I explained that I unfortunately do not know what everyone's insurances cover/deductibles, etc and I do not put a screening diagnosis if a condition already exists, even if the visit is for her annual  The visit itself is coded as an annual preventative exam/PAP smear is a screening  Pt still very upset, I am agreeable to change codes to screenings but advised pt in future to see PCP for all of the blood work recommendations  I was trying to help pt and make it easier and more convenient for her since she already had to get a lipid panel done, as I also discussed importance of managing hyperlipidemia at her visit to decrease chances of heart disease, stroke, MI, etc, especially with elevated LDL and I noticed she stopped her medication  I contacted billing office to discuss process  [facility] and info  Email sent today, 11/23/22 with new billing codes, screening for diabetes, screening for iron def  Anemia, screening vit D  I asked in email to let me know if anything else is needed and if ok to change

## 2022-11-28 DIAGNOSIS — Z13.1 SCREENING FOR DIABETES MELLITUS: ICD-10-CM

## 2022-11-28 DIAGNOSIS — Z13.21 ENCOUNTER FOR VITAMIN DEFICIENCY SCREENING: ICD-10-CM

## 2022-11-28 DIAGNOSIS — Z13.0 SCREENING, IRON DEFICIENCY ANEMIA: Primary | ICD-10-CM

## 2022-12-06 ENCOUNTER — TELEMEDICINE (OUTPATIENT)
Dept: FAMILY MEDICINE CLINIC | Facility: CLINIC | Age: 62
End: 2022-12-06

## 2022-12-06 DIAGNOSIS — Z23 ENCOUNTER FOR IMMUNIZATION: Primary | ICD-10-CM

## 2023-01-22 ENCOUNTER — HOSPITAL ENCOUNTER (OUTPATIENT)
Dept: MAMMOGRAPHY | Facility: HOSPITAL | Age: 63
Discharge: HOME | End: 2023-01-22

## 2023-01-22 VITALS — WEIGHT: 142 LBS | BODY MASS INDEX: 26.81 KG/M2 | HEIGHT: 61 IN

## 2023-01-22 DIAGNOSIS — Z12.31 ENCOUNTER FOR SCREENING MAMMOGRAM FOR MALIGNANT NEOPLASM OF BREAST: ICD-10-CM

## 2023-01-23 ENCOUNTER — TELEPHONE (OUTPATIENT)
Dept: FAMILY MEDICINE CLINIC | Facility: CLINIC | Age: 63
End: 2023-01-23

## 2023-01-23 DIAGNOSIS — E78.5 DYSLIPIDEMIA: ICD-10-CM

## 2023-01-23 RX ORDER — ROSUVASTATIN CALCIUM 10 MG/1
10 TABLET, COATED ORAL DAILY
Qty: 90 TABLET | Refills: 3 | Status: SHIPPED | OUTPATIENT
Start: 2023-01-23

## 2023-01-23 NOTE — TELEPHONE ENCOUNTER
Can you prescribe her rosuvastatin 10mg, 1x a day for a 90 day supply? Jeni Whitaker [facility]    Patient ph # [phone]